# Patient Record
Sex: MALE | Race: WHITE | Employment: FULL TIME | URBAN - METROPOLITAN AREA
[De-identification: names, ages, dates, MRNs, and addresses within clinical notes are randomized per-mention and may not be internally consistent; named-entity substitution may affect disease eponyms.]

---

## 2018-05-13 ENCOUNTER — HOSPITAL ENCOUNTER (EMERGENCY)
Age: 33
Discharge: HOME | End: 2018-05-13
Attending: EMERGENCY MEDICINE
Payer: COMMERCIAL

## 2018-05-13 VITALS
SYSTOLIC BLOOD PRESSURE: 157 MMHG | OXYGEN SATURATION: 97 % | HEIGHT: 73 IN | TEMPERATURE: 98.4 F | HEART RATE: 84 BPM | WEIGHT: 315 LBS | DIASTOLIC BLOOD PRESSURE: 100 MMHG | RESPIRATION RATE: 16 BRPM | BODY MASS INDEX: 41.75 KG/M2

## 2018-05-13 DIAGNOSIS — T78.40XA ALLERGIC REACTION, INITIAL ENCOUNTER: Primary | ICD-10-CM

## 2018-05-13 PROCEDURE — 74011636637 HC RX REV CODE- 636/637: Performed by: EMERGENCY MEDICINE

## 2018-05-13 PROCEDURE — 74011250637 HC RX REV CODE- 250/637: Performed by: EMERGENCY MEDICINE

## 2018-05-13 PROCEDURE — 99283 EMERGENCY DEPT VISIT LOW MDM: CPT | Performed by: EMERGENCY MEDICINE

## 2018-05-13 RX ORDER — LORATADINE 10 MG/1
10 TABLET ORAL
Status: COMPLETED | OUTPATIENT
Start: 2018-05-13 | End: 2018-05-13

## 2018-05-13 RX ORDER — FAMOTIDINE 20 MG/1
20 TABLET, FILM COATED ORAL
Status: COMPLETED | OUTPATIENT
Start: 2018-05-13 | End: 2018-05-13

## 2018-05-13 RX ORDER — PREDNISONE 20 MG/1
60 TABLET ORAL
Status: COMPLETED | OUTPATIENT
Start: 2018-05-13 | End: 2018-05-13

## 2018-05-13 RX ORDER — PREDNISONE 50 MG/1
50 TABLET ORAL DAILY
Qty: 7 TAB | Refills: 0 | Status: SHIPPED | OUTPATIENT
Start: 2018-05-13 | End: 2018-05-20

## 2018-05-13 RX ADMIN — FAMOTIDINE 20 MG: 20 TABLET ORAL at 20:56

## 2018-05-13 RX ADMIN — PREDNISONE 60 MG: 20 TABLET ORAL at 20:56

## 2018-05-13 RX ADMIN — LORATADINE 10 MG: 10 TABLET ORAL at 20:56

## 2018-05-14 NOTE — ED NOTES
I have reviewed discharge instructions with the patient. The patient verbalized understanding. Patient left ED via Discharge Method: ambulatory to Home with self. Opportunity for questions and clarification provided. Patient given 1 scripts. To continue your aftercare when you leave the hospital, you may receive an automated call from our care team to check in on how you are doing. This is a free service and part of our promise to provide the best care and service to meet your aftercare needs.  If you have questions, or wish to unsubscribe from this service please call [phone]. Thank you for Choosing our [facility].

## 2018-05-14 NOTE — ED PROVIDER NOTES
HPI Comments: Patient presents to return with symptoms consistent with an allergic reaction to an unknown allergen. The patient has no history of allergic reactions but states about an hour to an hour and a half ago he began itching all over and eyes became puffy and noted hives coming and going in the extremities and chest and is continued to have these symptoms. He denies any shortness of breath or oral edema. Patient is a 28 y.o. male presenting with allergic reaction. The history is provided by the patient. Allergic Reaction    This is a new problem. The problem has not changed since onset. Pertinent negatives include no shortness of breath. History reviewed. No pertinent past medical history. Past Surgical History:   Procedure Laterality Date    HX APPENDECTOMY           History reviewed. No pertinent family history. Social History     Social History    Marital status: SINGLE     Spouse name: N/A    Number of children: N/A    Years of education: N/A     Occupational History    Not on file. Social History Main Topics    Smoking status: Never Smoker    Smokeless tobacco: Current User    Alcohol use Yes    Drug use: No    Sexual activity: Not on file     Other Topics Concern    Not on file     Social History Narrative    No narrative on file         ALLERGIES: Mefoxin [cefoxitin]    Review of Systems   Constitutional: Negative for chills and fever. Respiratory: Negative for choking, chest tightness, shortness of breath, wheezing and stridor. All other systems reviewed and are negative. Vitals:    05/13/18 2034   BP: (!) 157/100   Pulse: 84   Resp: 16   Temp: 98.4 °F (36.9 °C)   SpO2: 97%   Weight: 145.2 kg (320 lb)   Height: 6' 1\" (1.854 m)            Physical Exam   Constitutional: He is oriented to person, place, and time. He appears well-developed and well-nourished. No distress. HENT:   Head: Normocephalic and atraumatic.    Mouth/Throat: Oropharynx is clear and moist. No oropharyngeal exudate. Eyes: Conjunctivae and EOM are normal. Pupils are equal, round, and reactive to light. Neck: Normal range of motion. Neck supple. Cardiovascular: Normal rate, regular rhythm and normal heart sounds. Pulmonary/Chest: Effort normal and breath sounds normal.   Abdominal: Soft. Bowel sounds are normal.   Musculoskeletal: Normal range of motion. Neurological: He is alert and oriented to person, place, and time. Skin: Skin is warm and dry. Multiple small urticaria noted scattered over the extremities and trunk. There is a smaller lesion that appears to be consistent with an insect sting in the right superior chest.  Patient states he was riding his motorcycle earlier today but does not recall insect sting. Psychiatric: He has a normal mood and affect. His behavior is normal.   Nursing note and vitals reviewed.        MDM  Number of Diagnoses or Management Options  Risk of Complications, Morbidity, and/or Mortality  Presenting problems: low  Diagnostic procedures: minimal  Management options: low    Patient Progress  Patient progress: stable        ED Course       Procedures

## 2018-05-14 NOTE — ED TRIAGE NOTES
Pt reports he started itching and feeling like his eyes are swelling for about 45 minutes. Pt denies any new foods. Pt denies shortness of breath.     Omero Maldonado RN

## 2019-04-04 ENCOUNTER — HOSPITAL ENCOUNTER (EMERGENCY)
Age: 34
Discharge: HOME | End: 2019-04-04
Attending: EMERGENCY MEDICINE
Payer: COMMERCIAL

## 2019-04-04 VITALS
OXYGEN SATURATION: 98 % | HEART RATE: 89 BPM | RESPIRATION RATE: 16 BRPM | TEMPERATURE: 97.9 F | WEIGHT: 315 LBS | DIASTOLIC BLOOD PRESSURE: 89 MMHG | SYSTOLIC BLOOD PRESSURE: 174 MMHG | HEIGHT: 73 IN | BODY MASS INDEX: 41.75 KG/M2

## 2019-04-04 DIAGNOSIS — S39.012A LOW BACK STRAIN, INITIAL ENCOUNTER: Primary | ICD-10-CM

## 2019-04-04 PROCEDURE — 99283 EMERGENCY DEPT VISIT LOW MDM: CPT | Performed by: EMERGENCY MEDICINE

## 2019-04-04 RX ORDER — METHOCARBAMOL 750 MG/1
750 TABLET, FILM COATED ORAL 4 TIMES DAILY
Qty: 28 TAB | Refills: 0 | Status: SHIPPED | OUTPATIENT
Start: 2019-04-04

## 2019-04-04 RX ORDER — HYDROCODONE BITARTRATE AND ACETAMINOPHEN 7.5; 325 MG/1; MG/1
1 TABLET ORAL
Qty: 4 TAB | Refills: 0 | Status: SHIPPED | OUTPATIENT
Start: 2019-04-04 | End: 2019-04-06

## 2019-04-04 RX ORDER — NAPROXEN SODIUM 550 MG/1
550 TABLET ORAL 2 TIMES DAILY WITH MEALS
Qty: 10 TAB | Refills: 0 | Status: SHIPPED | OUTPATIENT
Start: 2019-04-04

## 2019-04-04 NOTE — ED PROVIDER NOTES
51-year-old gentleman noted onset of low back pain as he was lifting weights and doing bent over rowing. Had some mild pain that was persistent but now more positional.  Some occasional pain running down the left lateral thigh to the knee. No weakness or numbness. No change in bowel or bladder. Past medical history is negative The history is provided by the patient. Back Pain This is a new problem. The current episode started 3 to 5 hours ago. The problem has not changed since onset. The problem occurs constantly. The pain is associated with lifting. The pain is present in the lower back. The quality of the pain is described as aching and dull. The pain radiates to the left thigh. The pain is moderate. The symptoms are aggravated by bending and twisting. Pertinent negatives include no fever, no numbness, no abdominal pain, no bladder incontinence, no paresthesias, no tingling and no weakness. She has tried nothing for the symptoms. History reviewed. No pertinent past medical history. Past Surgical History:  
Procedure Laterality Date  HX APPENDECTOMY  HX ORTHOPAEDIC History reviewed. No pertinent family history. Social History Socioeconomic History  Marital status: SINGLE Spouse name: Not on file  Number of children: Not on file  Years of education: Not on file  Highest education level: Not on file Occupational History  Not on file Social Needs  Financial resource strain: Not on file  Food insecurity:  
  Worry: Not on file Inability: Not on file  Transportation needs:  
  Medical: Not on file Non-medical: Not on file Tobacco Use  Smoking status: Never Smoker  Smokeless tobacco: Current User Substance and Sexual Activity  Alcohol use: Yes  Drug use: No  
 Sexual activity: Not on file Lifestyle  Physical activity:  
  Days per week: Not on file Minutes per session: Not on file  Stress: Not on file Relationships  Social connections:  
  Talks on phone: Not on file Gets together: Not on file Attends [employer] service: Not on file Active member of club or organization: Not on file Attends meetings of clubs or organizations: Not on file Relationship status: Not on file  Intimate partner violence:  
  Fear of current or ex partner: Not on file Emotionally abused: Not on file Physically abused: Not on file Forced sexual activity: Not on file Other Topics Concern  Not on file Social History Narrative  Not on file ALLERGIES: Mefoxin [cefoxitin] Review of Systems Constitutional: Negative for fever. Gastrointestinal: Negative for abdominal pain. Genitourinary: Negative for bladder incontinence and difficulty urinating. Musculoskeletal: Positive for back pain. Neurological: Negative for tingling, weakness, numbness and paresthesias. Vitals:  
 04/04/19 1817 04/04/19 1821 BP: (!) 181/113 (!) 185/93 Pulse: 96   
Resp: 16 Temp: 97.8 °F (36.6 °C) SpO2: 98% Weight: 157.4 kg (347 lb) Height: 6' 1\" (1.854 m) Physical Exam  
Constitutional: She appears well-developed and well-nourished. No distress. Musculoskeletal:  
     Lumbar back: She exhibits decreased range of motion and tenderness. She exhibits no bony tenderness. Neurological:  
Straight-leg raise sign 1+ equal deep tendon reflexes at knees and ankles. Good strength and normal gait. Decreased flexion due to pain. Skin: Skin is warm and dry. Nursing note and vitals reviewed. MDM Number of Diagnoses or Management Options Diagnosis management comments: Mild sciatica symptoms. I suspect low back strain. Do not believe imaging needed. Discussed this with patient. Risk of Complications, Morbidity, and/or Mortality Presenting problems: low Diagnostic procedures: minimal 
Management options: low Procedures

## 2019-04-04 NOTE — ED NOTES
I have reviewed discharge instructions with the patient. The patient and spouse verbalized understanding. Patient left ED via Discharge Method: ambulatory to Home with self and spouse. Opportunity for questions and clarification provided. Patient given 3 scripts. Work note provided. To continue your aftercare when you leave the hospital, you may receive an automated call from our care team to check in on how you are doing. This is a free service and part of our promise to provide the best care and service to meet your aftercare needs.  If you have questions, or wish to unsubscribe from this service please call [phone]. Thank you for Choosing our [facility].

## 2019-04-04 NOTE — DISCHARGE INSTRUCTIONS
Rest.  Avoid heavy lifting pulling or twisting. May try ice first and then heat. Medication for inflammation and muscle relaxants. Pain medication, hydrocodone, may use sparingly over 1-2 days for severe pain if he cannot get sleep. Call physician listed for follow-up. Recheck sooner for weakness or difficulty urinating. Patient Education        Back Strain: Care Instructions  Overview    A back strain happens when you overstretch, or pull, a muscle in your back. You may hurt your back in an accident or when you exercise or lift something. Sometimes you may not know how you hurt your back. Most back pain will get better with rest and time. You can take care of yourself at home to help your back heal.  Follow-up care is a key part of your treatment and safety. Be sure to make and go to all appointments, and call your doctor if you are having problems. It's also a good idea to know your test results and keep a list of the medicines you take. How can you care for yourself at home? · Try to stay as active as you can, but stop or reduce any activity that causes pain. · Put ice or a cold pack on the sore muscle for 10 to 20 minutes at a time to stop swelling. Try this every 1 to 2 hours for 3 days (when you are awake) or until the swelling goes down. Put a thin cloth between the ice pack and your skin. · After 2 or 3 days, apply a heating pad on low or a warm cloth to your back. Some doctors suggest that you go back and forth between hot and cold treatments. · Take pain medicines exactly as directed. ? If the doctor gave you a prescription medicine for pain, take it as prescribed. ? If you are not taking a prescription pain medicine, ask your doctor if you can take an over-the-counter medicine. · Try sleeping on your side with a pillow between your legs. Or put a pillow under your knees when you lie on your back. These measures can ease pain in your lower back.   · Return to your usual level of activity slowly. When should you call for help? Call 911 anytime you think you may need emergency care. For example, call if:    · You are unable to move a leg at all.   [facility] your doctor now or seek immediate medical care if:    · You have new or worse symptoms in your legs, belly, or buttocks. Symptoms may include:  ? Numbness or tingling. ? Weakness. ? Pain.     · You lose bladder or bowel control.    Watch closely for changes in your health, and be sure to contact your doctor if:    · You have a fever, lose weight, or don't feel well.     · You are not getting better as expected. Where can you learn more? Go to http://tani-noam.info/. Enter O428 in the search box to learn more about \"Back Strain: Care Instructions. \"  Current as of: September 20, 2018  Content Version: 11.9  © 7756-1466 [employer]. Care instructions adapted under license by [employer] (which disclaims liability or warranty for this information). If you have questions about a medical condition or this instruction, always ask your healthcare professional. [place] [address] any warranty or liability for your use of this information.

## 2019-04-04 NOTE — ED TRIAGE NOTES
Patient advises he was at gym today doing lifts and was lifting 315-320 lbs and started with lower back pain. Patient advises radiation down left leg while traveling to ER. no urinary complaints.

## 2019-04-04 NOTE — LETTER
[facility] EMERGENCY DEPT 
[address] [place] [id number] 
[phone] Work/School Note Date: 4/4/2019 To Whom It May concern: 
 
Bertrand Mallory was seen and treated today in the emergency room by the following provider(s): 
Attending Provider: Sonia Lcokwood MD.   
 
Bertrand Mallory may return to work on 4/7. Sincerely, Concepción Carrington MD